# Patient Record
Sex: FEMALE | Race: WHITE | NOT HISPANIC OR LATINO | URBAN - METROPOLITAN AREA
[De-identification: names, ages, dates, MRNs, and addresses within clinical notes are randomized per-mention and may not be internally consistent; named-entity substitution may affect disease eponyms.]

---

## 2023-11-07 PROBLEM — M54.50 CHRONIC MIDLINE LOW BACK PAIN WITHOUT SCIATICA: Status: ACTIVE | Noted: 2023-11-07

## 2023-11-07 PROBLEM — M99.09 SEGMENTAL AND SOMATIC DYSFUNCTION: Status: ACTIVE | Noted: 2023-11-07

## 2023-11-07 PROBLEM — M25.551 HIP PAIN, RIGHT: Status: ACTIVE | Noted: 2023-11-07

## 2023-11-07 PROBLEM — M54.2 CERVICALGIA: Status: ACTIVE | Noted: 2023-11-07

## 2023-11-07 PROBLEM — M54.50 LUMBAGO: Status: ACTIVE | Noted: 2023-11-07

## 2023-11-07 PROBLEM — M53.3 SACRAL BACK PAIN: Status: ACTIVE | Noted: 2023-11-07

## 2023-11-07 PROBLEM — M79.10 MYALGIA: Status: ACTIVE | Noted: 2023-11-07

## 2023-11-07 PROBLEM — G89.29 CHRONIC MIDLINE LOW BACK PAIN WITHOUT SCIATICA: Status: ACTIVE | Noted: 2023-11-07

## 2023-11-07 PROBLEM — M54.6 THORACIC SPINE PAIN: Status: ACTIVE | Noted: 2023-11-07

## 2023-11-07 PROBLEM — G89.29 CHRONIC BILATERAL BACK PAIN: Status: ACTIVE | Noted: 2023-11-07

## 2023-11-07 PROBLEM — M54.9 CHRONIC BILATERAL BACK PAIN: Status: ACTIVE | Noted: 2023-11-07

## 2023-11-07 ASSESSMENT — ENCOUNTER SYMPTOMS
AGITATION: 0
FACIAL ASYMMETRY: 0
ABDOMINAL PAIN: 0
NAUSEA: 0
FEVER: 0
CHILLS: 0
SHORTNESS OF BREATH: 0
CONFUSION: 0
DIFFICULTY URINATING: 0

## 2023-11-07 NOTE — PROGRESS NOTES
Subjective   Patient ID: Virginia Rosas is a 33 y.o. female who presents today for the re-examination and treatment of pain involving their R>L neck/upper back and right sided low back tightness.    This is visit 5/15 of the calendar year. Medicaid plan.    Fall risk: None. No falls in the last 6 months.     HPI - Virginia presents today with similar complaints of right more than right neck/trapezius and right sided low back pain. Her last visit was in May of this year as she is getting closer to finishing school in May of next year. She will go on walks and perform lighter intensity workouts to maintain her physical health. Uses a massage ball for trigger joints for pain relief. No additional injuries or changes in her medical history.    Patient describes the pain as achiness, tightness, and nagging, and rates the current pain 4 out of 10 (10 being the worst).     Last treatment visit 5/12/23: She is done with the current semester and has a whole week off from school. Today she would like to check full spine for any restrictions or imbalances. The most localized area of tightness and tension is in the upper back neck and shoulders.     Otherwise no significant changes to her past medical history.    Review of Systems   Constitutional:  Negative for chills and fever.   HENT:  Negative for congestion and sneezing.    Eyes:  Negative for visual disturbance.   Respiratory:  Negative for shortness of breath.    Cardiovascular:  Negative for chest pain.   Gastrointestinal:  Negative for abdominal pain and nausea.   Endocrine: Negative for polyuria.   Genitourinary:  Negative for difficulty urinating.   Neurological:  Negative for facial asymmetry.   Psychiatric/Behavioral:  Negative for agitation and confusion.      Objective   Observation : normal gait and normal posture    Physical Exam  Musculoskeletal:      Cervical back: Tenderness (right upper trapezius) present. No swelling or deformity. No pain with movement.  Normal range of motion.      Thoracic back: Normal. No swelling or deformity. Normal range of motion.      Lumbar back: Tenderness present. No swelling or deformity.   Neurological:      General: No focal deficit present.      Mental Status: She is alert and oriented to person, place, and time.      Sensory: Sensation is intact.      Motor: Motor function is intact.      Coordination: Coordination is intact.      Gait: Gait is intact.     Examination findings (palpation & ROM): Tenderness and hypertonicity over the upper trapezius more on the right than left.  Additional trigger points and hypertonicity of the C/T paraspinals, levator scap, rhomboids.       Segmental joint dysfunction was identified in the following areas using motion palpation and/or pain provocation assessment:  Cervical: C0-C5 (Supine)  Thoracic: T3-T8 (Prone)  Lumbopelvic: R SIJ (Drop), L3-L5 (Side-posture).     Today's treatment:  Performed spinal manipulation to the regions of segmental dysfunction identified on examination using age-appropriate and injury specific force, and manual diversified technique.     Brief seated soft tissue manipulation was performed including IASTM (instrument assisted soft tissue mobilization), MFR (Myofacial Release) and IC (ischemic compression) to the hypertonic paraspinal muscles including R>L upper trapezius, R>L cervical/thoracic paraspinals, R>L levator scapulae, rhomboids to patient tolerance. Dry needling was also performed with IDN guidelines to the same affected soft tissues. Specifically the R>L UT muscles/tissues (10 in, 10 out). Supine MFR and IC to the suboccipitals, cervical paraspinals, R>L levator scapulae, and R>L UT. Passive R and L hip flexor stretch prone.     Recommended downward dog on the wall, theracane, and massage ball to increase mobility and relief neck and low back tightness.     Treatment Plan:   The patient and I discussed the risks and benefits of chiropractic care. Based on the  patient's subjective complaints along with the examination findings, it is advised that a course of chiropractic treatment by initiated. Consent for care was given both written and orally by the patient. The patient tolerated today's treatment with little or no additional discomfort and was instructed to contact the office for questions or concerns.     Treatment Frequency: Will see/treat patient every 2-4 weeks as therapeutic benefit is sustained, then frequency will be reduced to as needed for symptom management or as needed for acute flare-ups/exacerbation.     Please note: Voice-to-text software was used when completing this note.  While the note was proofread, portions may include grammatical errors.  Please contact me with any questions/concerns as it relates to these types of errors.

## 2023-11-08 ENCOUNTER — ALLIED HEALTH (OUTPATIENT)
Dept: INTEGRATIVE MEDICINE | Facility: CLINIC | Age: 33
End: 2023-11-08
Payer: MEDICAID

## 2023-11-08 DIAGNOSIS — M99.03 SEGMENTAL AND SOMATIC DYSFUNCTION OF LUMBAR REGION: ICD-10-CM

## 2023-11-08 DIAGNOSIS — M99.04 SEGMENTAL AND SOMATIC DYSFUNCTION OF SACRAL REGION: ICD-10-CM

## 2023-11-08 DIAGNOSIS — M79.9 POSTURAL STRAIN: ICD-10-CM

## 2023-11-08 DIAGNOSIS — M99.02 SEGMENTAL AND SOMATIC DYSFUNCTION OF THORACIC REGION: ICD-10-CM

## 2023-11-08 DIAGNOSIS — M99.05 SEGMENTAL AND SOMATIC DYSFUNCTION OF PELVIC REGION: ICD-10-CM

## 2023-11-08 DIAGNOSIS — M54.50 RIGHT-SIDED LOW BACK PAIN WITHOUT SCIATICA, UNSPECIFIED CHRONICITY: ICD-10-CM

## 2023-11-08 DIAGNOSIS — M54.2 NECK PAIN: ICD-10-CM

## 2023-11-08 DIAGNOSIS — M99.01 SEGMENTAL AND SOMATIC DYSFUNCTION OF CERVICAL REGION: Primary | ICD-10-CM

## 2023-11-08 DIAGNOSIS — M53.3 SACRAL BACK PAIN: ICD-10-CM

## 2023-11-08 DIAGNOSIS — M79.10 MYALGIA: ICD-10-CM

## 2023-11-08 PROCEDURE — 98942 CHIROPRACTIC MANJ 5 REGIONS: CPT | Performed by: CHIROPRACTOR

## 2023-11-08 PROCEDURE — 99213 OFFICE O/P EST LOW 20 MIN: CPT | Performed by: CHIROPRACTOR

## 2023-11-14 NOTE — PROGRESS NOTES
Assessment/Plan   The patient's R sided neck/upper back pain/tightness is consistent with myofascial pain and possible compensation related to R shoulder limited IR (possible originally lost ROM due to overhead activity / tennis).  There are no red flags or deficits on examination aside from a trace positive left Vazquez's reflex which may be normal for her.  She has responded well in the past to chiropractic spinal manipulation and advised continuing this therapy in addition to soft tissue therapies and possibly dry needling including areas of chief complaint such as the upper trapezius    Visits this year: 6    Subjective   Patient reports she got relief from her last visit but has had gradual return of frequent mild pain and tightness affecting the right cervical thoracic region lower back locally and occasionally gets tingling in her right calf.  Notes that she exercises regularly but has dealt with chronic pain and tightness in these areas.  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  She stretches regularly in addition to exercise and does some self massage with a ball in this area to try to loosen it up.  Has had a lot of benefit with chiropractic treatments in the past although the upper trapezius muscle does not seem to fully loosen. Feels she is overall healthy yet does endorse allergies to tree nuts and reports sensitivity to gluten/dairy.    LBP 11/15/23 - chronic mild LBP R>L and tightness in R hip. Occasional  tingling R calf.    Review of Systems   Constitutional:  Negative for fever.   Eyes:  Negative for visual disturbance.   Respiratory:  Negative for shortness of breath.    Cardiovascular:  Negative for chest pain.   Gastrointestinal:  Negative for diarrhea, nausea and vomiting.   Genitourinary:  Negative for difficulty urinating and dysuria.   Skin:  Negative for color change.   Neurological:  Negative for dizziness.   Psychiatric/Behavioral:  Negative for agitation.    All other systems reviewed and are negative.    Objective   Examination findings (e.g., palpation & ROM): Decreased C/S and L/S ROM with pain, hypertonic and tender cervical and lumbar erectors, mild restriction R hip ROM and R shoulder IR 20* with pain/tightness    Segmental joint dysfunction was identified in the following areas using motion palpation and/or pain provocation assessment:  Cervical: 1-2 (L)  Thoracic: 1-2, 6-8, 12  Lumbopelvic:  1-2, BL SIJ    Physical Exam  Neurological:      General: No focal deficit present.      Mental Status: She is alert.      Sensory: Sensation is intact.      Motor: Motor function is intact.      Coordination: Coordination is intact.      Gait: Gait is intact.      Deep Tendon Reflexes: Reflexes are normal and symmetric.      Reflex Scores:       Tricep reflexes are 2+ on the right side and 2+ on the left side.       Bicep reflexes are 2+ on the right side and 2+ on the left side.       Brachioradialis reflexes are 2+ on the right side and 2+ on the left side.       Patellar reflexes are 2+ on the right side and 2+ on the left side.       Achilles reflexes are 2+ on the right side and 2+ on the left side.     Comments: Faint +ve Vazquez's reflex (L) 11/15/23       Plan   Today's treatment:  STM to patient tolerance to hypertonic paraspinal muscles  SMT to regions of segmental dysfunction identified on exam, using age-appropriate force, and manual diversified technique.   Dry needling (10 in, 10 out) to region  of the chief complaint / hypertonic muscles identified upon palpation in R upper trapezius  Manual dynamic stretching of the gluteal muscles, hamstrings, upper trapezius    Treatment Plan:   The patient and I discussed the risks and benefits of chiropractic care. Based on the patient's subjective complaints along with the examination findings, it is advised that a course of chiropractic treatment by initiated. The patient provided consent for care. The patient tolerated today's treatment with little or no additional discomfort and was instructed to contact the office for questions or concerns. Will see patient once per week then every 2 weeks when symptoms become mild/manageable, further spaced apart contingent upon improvement.     This chart note was generated using dictation software, and as such, there may be typographical errors present. Abbreviations: Cervical spine (CS), cervical-thoracic (CT), Dry needling (DN), Flexion adduction internal rotation (FAIR), high velocity, low amplitude (HVLA), Lumbar spine (LS), Soft tissue manipulation (STM), spinal manipulative therapy (SMT), Straight leg raise (SLR), Thoracic spine (TS).

## 2023-11-15 ENCOUNTER — ALLIED HEALTH (OUTPATIENT)
Dept: INTEGRATIVE MEDICINE | Facility: CLINIC | Age: 33
End: 2023-11-15
Payer: MEDICAID

## 2023-11-15 DIAGNOSIS — M99.02 SEGMENTAL AND SOMATIC DYSFUNCTION OF THORACIC REGION: ICD-10-CM

## 2023-11-15 DIAGNOSIS — M99.01 CERVICAL SEGMENT DYSFUNCTION: ICD-10-CM

## 2023-11-15 DIAGNOSIS — M79.10 MYALGIA: ICD-10-CM

## 2023-11-15 DIAGNOSIS — M54.2 NECK PAIN: ICD-10-CM

## 2023-11-15 DIAGNOSIS — M99.03 SEGMENTAL AND SOMATIC DYSFUNCTION OF LUMBAR REGION: Primary | ICD-10-CM

## 2023-11-15 PROCEDURE — 99213 OFFICE O/P EST LOW 20 MIN: CPT | Performed by: CHIROPRACTOR

## 2023-11-15 PROCEDURE — 98941 CHIROPRACT MANJ 3-4 REGIONS: CPT | Performed by: CHIROPRACTOR

## 2023-11-15 ASSESSMENT — ENCOUNTER SYMPTOMS
DIARRHEA: 0
FEVER: 0
VOMITING: 0
AGITATION: 0
DYSURIA: 0
DIFFICULTY URINATING: 0
SHORTNESS OF BREATH: 0
COLOR CHANGE: 0
NAUSEA: 0
DIZZINESS: 0

## 2023-11-22 ENCOUNTER — ALLIED HEALTH (OUTPATIENT)
Dept: INTEGRATIVE MEDICINE | Facility: CLINIC | Age: 33
End: 2023-11-22
Payer: MEDICAID

## 2023-11-22 DIAGNOSIS — M54.2 NECK PAIN: ICD-10-CM

## 2023-11-22 DIAGNOSIS — M99.01 SEGMENTAL AND SOMATIC DYSFUNCTION OF CERVICAL REGION: Primary | ICD-10-CM

## 2023-11-22 DIAGNOSIS — M99.05 SEGMENTAL AND SOMATIC DYSFUNCTION OF PELVIC REGION: ICD-10-CM

## 2023-11-22 DIAGNOSIS — M99.02 SEGMENTAL AND SOMATIC DYSFUNCTION OF THORACIC REGION: ICD-10-CM

## 2023-11-22 DIAGNOSIS — M54.50 RIGHT-SIDED LOW BACK PAIN WITHOUT SCIATICA, UNSPECIFIED CHRONICITY: ICD-10-CM

## 2023-11-22 DIAGNOSIS — M99.03 SEGMENTAL AND SOMATIC DYSFUNCTION OF LUMBAR REGION: ICD-10-CM

## 2023-11-22 PROCEDURE — 98941 CHIROPRACT MANJ 3-4 REGIONS: CPT | Performed by: CHIROPRACTOR

## 2023-11-22 ASSESSMENT — ENCOUNTER SYMPTOMS
JOINT SWELLING: 0
FEVER: 0
TROUBLE SWALLOWING: 0
CONSTIPATION: 0
CONFUSION: 0
FREQUENCY: 0
ABDOMINAL PAIN: 0
DIARRHEA: 0
FATIGUE: 0
CHEST TIGHTNESS: 0

## 2023-11-22 NOTE — PROGRESS NOTES
Subjective   Patient ID: Virginia Rosas is a 33 y.o. female who presents today for full spine complaints.    8/15 [facility]    HPI -patient is presenting for continued chiropractic care.  She typically sees one of my coworkers for treatment, but wished to be seen by first available.  She is endorsing tension and soreness throughout the spine, but most notably in the upper trapezius muscles.  Discomfort is greater on the right.  She denies any ominous symptoms.  She reports that her history of playing tennis as well as playing violin are contributing factors to this.  She traditionally responds well to chiropractic care.    Review of Systems   Constitutional:  Negative for fatigue and fever.   HENT:  Negative for trouble swallowing.    Eyes:  Negative for visual disturbance.   Respiratory:  Negative for chest tightness.    Cardiovascular:  Negative for chest pain and leg swelling.   Gastrointestinal:  Negative for abdominal pain, constipation and diarrhea.   Genitourinary:  Negative for frequency.   Musculoskeletal:  Negative for joint swelling.   Neurological:  Negative for syncope.   Psychiatric/Behavioral:  Negative for confusion.    All other systems reviewed and are negative.      Relevant Imaging:    Objective     The patient is alert and oriented x 3. Loss of cervical and thoracic curves. Cranial nerves II-XII are grossly intact. No difficulty with transitional movements is observed.  No deficits observed when analyzing gait.  No scarring is present.  No evidence of muscle wasting.    Moderate hypertonicity and tenderness is present in the cervical paraspinals, upper trapezius, levator scapulae, rhomboids, thoracic paraspinals, lumbar paraspinals, greater on the right.    Segmental joint dysfunction was assessed with motion palpation and is identified in the following areas:  Cervical: C4/5  Thoracic: T3/4, T4/5, T5/6  Lumbopelvic: L4/5, L5/S1, Right SI,    Assessment/Plan   Segmental and somatic dysfunction,  myofascial pain syndrome, postural strain.      Today's treatment:  Diversified manipulation applied to the involved cervical, thoracic and lumbar segments, and right SI joint.      Neuromuscular re-education: Integrative dry needling (3 in/out) applied to the upper trapezius muscles bilaterally. Manual therapy techniques in the form of IASTM applied to the upper trapezius muscles.  7 minutes.    Treatment Plan:   The patient tolerated today's treatment with little or no additional discomfort and was instructed to contact the office for questions or concerns. They were advised on post-treatment soreness. Return as needed.    Please note: Voice to text software was used when completing this note. While the note was proofread, portions may include grammatical errors. Please contact me with any questions/concerns as it relates to these types of errors.

## 2023-11-29 ENCOUNTER — ALLIED HEALTH (OUTPATIENT)
Dept: INTEGRATIVE MEDICINE | Facility: CLINIC | Age: 33
End: 2023-11-29
Payer: MEDICAID

## 2023-11-29 DIAGNOSIS — M79.9 POSTURAL STRAIN: ICD-10-CM

## 2023-11-29 DIAGNOSIS — M54.2 NECK PAIN: ICD-10-CM

## 2023-11-29 DIAGNOSIS — M99.05 SEGMENTAL AND SOMATIC DYSFUNCTION OF PELVIC REGION: ICD-10-CM

## 2023-11-29 DIAGNOSIS — M99.04 SEGMENTAL AND SOMATIC DYSFUNCTION OF SACRAL REGION: ICD-10-CM

## 2023-11-29 DIAGNOSIS — M99.02 SEGMENTAL AND SOMATIC DYSFUNCTION OF THORACIC REGION: ICD-10-CM

## 2023-11-29 DIAGNOSIS — M79.10 MYALGIA: ICD-10-CM

## 2023-11-29 DIAGNOSIS — M99.01 SEGMENTAL AND SOMATIC DYSFUNCTION OF CERVICAL REGION: Primary | ICD-10-CM

## 2023-11-29 DIAGNOSIS — M53.3 SACRAL BACK PAIN: ICD-10-CM

## 2023-11-29 DIAGNOSIS — M54.50 RIGHT-SIDED LOW BACK PAIN WITHOUT SCIATICA, UNSPECIFIED CHRONICITY: ICD-10-CM

## 2023-11-29 DIAGNOSIS — M99.03 SEGMENTAL AND SOMATIC DYSFUNCTION OF LUMBAR REGION: ICD-10-CM

## 2023-11-29 PROCEDURE — 98942 CHIROPRACTIC MANJ 5 REGIONS: CPT | Performed by: CHIROPRACTOR

## 2023-11-29 ASSESSMENT — ENCOUNTER SYMPTOMS
CHILLS: 0
ABDOMINAL PAIN: 0
FACIAL ASYMMETRY: 0
CONFUSION: 0
AGITATION: 0
NAUSEA: 0
DIFFICULTY URINATING: 0
SHORTNESS OF BREATH: 0
FEVER: 0

## 2023-11-29 NOTE — PROGRESS NOTES
Subjective   Patient ID: Virginia Rosas is a 33 y.o. female who presents today for the re-examination and treatment of pain involving their R>L neck/upper back and right sided low back tightness.    This is visit 8/15 of the calendar year. Medicaid plan.    Fall risk: None. No falls in the last 6 months.     HPI - Virginia would like to focus treatment on her right side of the lower back today.  This is a newer symptom but she reports she has had this pain in the past.  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  She feels that her last couple adjustments they have not gotten a lot of movement in the lower back so hopefully if we do a little soft tissue work before we can get better release of the joints today.  Secondary complaints are her chronically tight neck upper back and shoulders.    Patient describes the pain as achiness, tightness, and nagging, and rates the current pain 4 out of 10 (10 being the worst).     Last treatment visit 11/22/23 with Dr. Fregoso.      Otherwise no significant changes to her past medical history.    Review of Systems   Constitutional:  Negative for chills and fever.   HENT:  Negative for congestion and sneezing.    Eyes:  Negative for visual disturbance.   Respiratory:  Negative for shortness of breath.    Cardiovascular:  Negative for chest pain.   Gastrointestinal:  Negative for abdominal pain and nausea.   Endocrine: Negative for polyuria.   Genitourinary:  Negative for difficulty urinating.   Neurological:  Negative for facial asymmetry.   Psychiatric/Behavioral:  Negative for agitation and confusion.      Objective   Observation : normal gait and normal posture    Physical ExamExamination findings (palpation & ROM): Tenderness, hypertonicity and trigger points throughout the right quadratus lumborum muscle which does reproduce her symptoms.  Additional, but to a lesser degree, hypertonicity and tenderness through the upper trapezius, rhomboids  and lower traps.      Segmental joint dysfunction was identified in the following areas using motion palpation and/or pain provocation assessment:  Cervical: C0-C5 (Supine)  Thoracic: T3-T8 (Prone)  Lumbopelvic: R SIJ (Drop), L3-L5 (Side-posture).     Today's treatment:  Performed spinal manipulation to the regions of segmental dysfunction identified on examination using age-appropriate and injury specific force, and manual diversified technique.     Brief side-lying soft tissue manipulation was performed including IASTM (instrument assisted soft tissue mobilization), MFR (Myofacial Release) and IC (ischemic compression) to the right quadratus lumborum, right lumbar paraspinals.  Dry needling was also performed to the right QL 16, 6 out.  Supine MFR and IC to the suboccipitals, cervical paraspinals, R>L levator scapulae, and R>L UT. Passive R and L hip flexor stretch prone.     Recommended downward dog on the wall, theracane, and massage ball to increase mobility and relief neck and low back tightness.     Treatment Plan:   The patient and I discussed the risks and benefits of chiropractic care. Based on the patient's subjective complaints along with the examination findings, it is advised that a course of chiropractic treatment by initiated. Consent for care was given both written and orally by the patient. The patient tolerated today's treatment with little or no additional discomfort and was instructed to contact the office for questions or concerns.     Treatment Frequency: Will see/treat patient every 2-4 weeks as therapeutic benefit is sustained, then frequency will be reduced to as needed for symptom management or as needed for acute flare-ups/exacerbation.     Please note: Voice-to-text software was used when completing this note.  While the note was proofread, portions may include grammatical errors.  Please contact me with any questions/concerns as it relates to these types of errors.

## 2023-12-06 ENCOUNTER — ALLIED HEALTH (OUTPATIENT)
Dept: INTEGRATIVE MEDICINE | Facility: CLINIC | Age: 33
End: 2023-12-06
Payer: MEDICAID

## 2023-12-06 DIAGNOSIS — M54.2 NECK PAIN: ICD-10-CM

## 2023-12-06 DIAGNOSIS — M79.10 MYALGIA: ICD-10-CM

## 2023-12-06 DIAGNOSIS — M99.02 SEGMENTAL AND SOMATIC DYSFUNCTION OF THORACIC REGION: ICD-10-CM

## 2023-12-06 DIAGNOSIS — M99.03 SEGMENTAL AND SOMATIC DYSFUNCTION OF LUMBAR REGION: ICD-10-CM

## 2023-12-06 DIAGNOSIS — M53.3 SACRAL BACK PAIN: ICD-10-CM

## 2023-12-06 DIAGNOSIS — M54.50 RIGHT-SIDED LOW BACK PAIN WITHOUT SCIATICA, UNSPECIFIED CHRONICITY: ICD-10-CM

## 2023-12-06 DIAGNOSIS — M79.9 POSTURAL STRAIN: ICD-10-CM

## 2023-12-06 DIAGNOSIS — M99.04 SEGMENTAL AND SOMATIC DYSFUNCTION OF SACRAL REGION: ICD-10-CM

## 2023-12-06 DIAGNOSIS — M99.01 SEGMENTAL AND SOMATIC DYSFUNCTION OF CERVICAL REGION: Primary | ICD-10-CM

## 2023-12-06 DIAGNOSIS — M99.05 SEGMENTAL AND SOMATIC DYSFUNCTION OF PELVIC REGION: ICD-10-CM

## 2023-12-06 PROCEDURE — 98942 CHIROPRACTIC MANJ 5 REGIONS: CPT | Performed by: CHIROPRACTOR

## 2023-12-06 ASSESSMENT — ENCOUNTER SYMPTOMS
CONFUSION: 0
FEVER: 0
ABDOMINAL PAIN: 0
SHORTNESS OF BREATH: 0
FACIAL ASYMMETRY: 0
AGITATION: 0
NAUSEA: 0
DIFFICULTY URINATING: 0
CHILLS: 0

## 2023-12-06 NOTE — PROGRESS NOTES
Subjective   Patient ID: Virginia Rosas is a 33 y.o. female who presents today for the re-examination and treatment of pain involving their R>L neck/upper back and right sided low back tightness.    This is visit 9/15 of the calendar year. Medicaid plan.    Fall risk: None. No falls in the last 6 months.     HPI - Last visit we worked on her R QL.  This did make a significant difference and she would like to continue to work on this area again today.  She also continues to have chronic tightness of her neck upper back and shoulders.    Patient describes the pain as achiness, tightness, and nagging, and rates the current pain 4 out of 10 (10 being the worst).     Last treatment visit 11/29/23: Virginia would like to focus treatment on her right side of the lower back today.  This is a newer symptom but she reports she has had this pain in the past.  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  She feels that her last couple adjustments they have not gotten a lot of movement in the lower back so hopefully if we do a little soft tissue work before we can get better release of the joints today.  Secondary complaints are her chronically tight neck upper back and shoulders.     Otherwise no significant changes to her past medical history.    Review of Systems   Constitutional:  Negative for chills and fever.   HENT:  Negative for congestion and sneezing.    Eyes:  Negative for visual disturbance.   Respiratory:  Negative for shortness of breath.    Cardiovascular:  Negative for chest pain.   Gastrointestinal:  Negative for abdominal pain and nausea.   Endocrine: Negative for polyuria.   Genitourinary:  Negative for difficulty urinating.   Neurological:  Negative for facial asymmetry.   Psychiatric/Behavioral:  Negative for agitation and confusion.      Objective   Observation : normal gait and normal posture    Physical ExamExamination findings (palpation & ROM): Tenderness, hypertonicity  and trigger points throughout the right quadratus lumborum muscle which does reproduce her symptoms.  Additional, but to a lesser degree, hypertonicity and tenderness through the upper trapezius, rhomboids and lower traps.      Segmental joint dysfunction was identified in the following areas using motion palpation and/or pain provocation assessment:  Cervical: C0-C5 (Supine)  Thoracic: T3-T8 (Prone)  Lumbopelvic: R SIJ (Drop), L3-L5 (Side-posture).     Today's treatment:  Performed spinal manipulation to the regions of segmental dysfunction identified on examination using age-appropriate and injury specific force, and manual diversified technique.     Brief side-lying soft tissue manipulation was performed including IASTM (instrument assisted soft tissue mobilization), MFR (Myofacial Release) and IC (ischemic compression) to the right quadratus lumborum, right lumbar paraspinals.  Dry needling was also performed to the right QL (10 in, 10 out).   Supine MFR and IC to the suboccipitals, cervical paraspinals, R>L levator scapulae, and R>L UT. Passive R and L hip flexor stretch prone.     Treatment Plan:   The patient and I discussed the risks and benefits of chiropractic care. Based on the patient's subjective complaints along with the examination findings, it is advised that a course of chiropractic treatment by initiated. Consent for care was given both written and orally by the patient. The patient tolerated today's treatment with little or no additional discomfort and was instructed to contact the office for questions or concerns.     Treatment Frequency: Will see/treat patient every 2-4 weeks as therapeutic benefit is sustained, then frequency will be reduced to as needed for symptom management or as needed for acute flare-ups/exacerbation.     Please note: Voice-to-text software was used when completing this note.  While the note was proofread, portions may include grammatical errors.  Please contact me with  any questions/concerns as it relates to these types of errors.

## 2023-12-13 ENCOUNTER — ALLIED HEALTH (OUTPATIENT)
Dept: INTEGRATIVE MEDICINE | Facility: CLINIC | Age: 33
End: 2023-12-13
Payer: MEDICAID

## 2023-12-13 DIAGNOSIS — M99.02 SEGMENTAL AND SOMATIC DYSFUNCTION OF THORACIC REGION: ICD-10-CM

## 2023-12-13 DIAGNOSIS — M54.50 RIGHT-SIDED LOW BACK PAIN WITHOUT SCIATICA, UNSPECIFIED CHRONICITY: ICD-10-CM

## 2023-12-13 DIAGNOSIS — M53.3 SACRAL BACK PAIN: ICD-10-CM

## 2023-12-13 DIAGNOSIS — M99.04 SEGMENTAL AND SOMATIC DYSFUNCTION OF SACRAL REGION: ICD-10-CM

## 2023-12-13 DIAGNOSIS — M99.05 SEGMENTAL AND SOMATIC DYSFUNCTION OF PELVIC REGION: ICD-10-CM

## 2023-12-13 DIAGNOSIS — M99.01 SEGMENTAL AND SOMATIC DYSFUNCTION OF CERVICAL REGION: Primary | ICD-10-CM

## 2023-12-13 DIAGNOSIS — M79.10 MYALGIA: ICD-10-CM

## 2023-12-13 DIAGNOSIS — M79.9 POSTURAL STRAIN: ICD-10-CM

## 2023-12-13 DIAGNOSIS — M99.03 SEGMENTAL AND SOMATIC DYSFUNCTION OF LUMBAR REGION: ICD-10-CM

## 2023-12-13 DIAGNOSIS — M54.2 NECK PAIN: ICD-10-CM

## 2023-12-13 PROCEDURE — 98942 CHIROPRACTIC MANJ 5 REGIONS: CPT | Performed by: CHIROPRACTOR

## 2023-12-13 ASSESSMENT — ENCOUNTER SYMPTOMS
AGITATION: 0
FEVER: 0
CONFUSION: 0
SHORTNESS OF BREATH: 0
ABDOMINAL PAIN: 0
CHILLS: 0
DIFFICULTY URINATING: 0
FACIAL ASYMMETRY: 0
NAUSEA: 0

## 2023-12-13 NOTE — PROGRESS NOTES
Subjective   Patient ID: Virginia Rosas is a 33 y.o. female who presents today for the re-examination and treatment of pain involving their R>L neck/upper back and right sided low back tightness.    This is visit 10/15 of the calendar year. Medicaid plan.    Fall risk: None. No falls in the last 6 months.     HPI -lower back pain is feeling significantly better after her last treatment.  She has been able to work out more regularly because she has been off from school for the last few days.  She suspects this may be why her neck and upper back are little more sore and stiff than usual.  She would like to focus treatment on the right greater than left upper trap and neck but check full spine for any additional joint restrictions or muscular imbalances.    Patient describes the pain as achiness, tightness, and nagging, and rates the current pain 4 out of 10 (10 being the worst).     Last treatment visit 12/6/23: Last visit we worked on her R QL.  This did make a significant difference and she would like to continue to work on this area again today.  She also continues to have chronic tightness of her neck upper back and shoulders.    11/29/23: Virginia would like to focus treatment on her right side of the lower back today.  This is a newer symptom but she reports she has had this pain in the past.  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  She feels that her last couple adjustments they have not gotten a lot of movement in the lower back so hopefully if we do a little soft tissue work before we can get better release of the joints today.  Secondary complaints are her chronically tight neck upper back and shoulders.     Otherwise no significant changes to her past medical history.    Review of Systems   Constitutional:  Negative for chills and fever.   HENT:  Negative for congestion and sneezing.    Eyes:  Negative for visual disturbance.   Respiratory:  Negative for shortness of  breath.    Cardiovascular:  Negative for chest pain.   Gastrointestinal:  Negative for abdominal pain and nausea.   Endocrine: Negative for polyuria.   Genitourinary:  Negative for difficulty urinating.   Neurological:  Negative for facial asymmetry.   Psychiatric/Behavioral:  Negative for agitation and confusion.      Objective   Observation : normal gait and normal posture    Physical ExamExamination findings (palpation & ROM): Tenderness, hypertonicity and trigger points throughout the right quadratus lumborum muscle which does reproduce her symptoms.  Additional, but to a lesser degree, hypertonicity and tenderness through the upper trapezius, rhomboids and lower traps.      Segmental joint dysfunction was identified in the following areas using motion palpation and/or pain provocation assessment:  Cervical: C0-C5 (Supine)  Thoracic: T3-T8 (Prone)  Lumbopelvic: R SIJ (Drop), L3-L5 (Side-posture).     Today's treatment:  Performed spinal manipulation to the regions of segmental dysfunction identified on examination using age-appropriate and injury specific force, and manual diversified technique.     Brief supine myofascial release to the neck upper back and shoulders including the right upper trapezius and levator scapula.  Additional dry needling in prone position to the bilateral upper trapezii (10 in, 10 out) and additional ischemic compression to the lumbar paraspinals prior to manipulation.    Treatment Plan:   The patient and I discussed the risks and benefits of chiropractic care. Based on the patient's subjective complaints along with the examination findings, it is advised that a course of chiropractic treatment by initiated. Consent for care was given both written and orally by the patient. The patient tolerated today's treatment with little or no additional discomfort and was instructed to contact the office for questions or concerns.     Treatment Frequency: Will see/treat patient every 2-4 weeks as  therapeutic benefit is sustained, then frequency will be reduced to as needed for symptom management or as needed for acute flare-ups/exacerbation.     Please note: Voice-to-text software was used when completing this note.  While the note was proofread, portions may include grammatical errors.  Please contact me with any questions/concerns as it relates to these types of errors.

## 2023-12-19 ENCOUNTER — ALLIED HEALTH (OUTPATIENT)
Dept: INTEGRATIVE MEDICINE | Facility: CLINIC | Age: 33
End: 2023-12-19
Payer: MEDICAID

## 2023-12-19 DIAGNOSIS — G89.29 CHRONIC BILATERAL THORACIC BACK PAIN: ICD-10-CM

## 2023-12-19 DIAGNOSIS — G89.29 CHRONIC BILATERAL LOW BACK PAIN WITHOUT SCIATICA: ICD-10-CM

## 2023-12-19 DIAGNOSIS — M99.05 SEGMENTAL AND SOMATIC DYSFUNCTION OF PELVIC REGION: ICD-10-CM

## 2023-12-19 DIAGNOSIS — M79.9 POSTURAL STRAIN: ICD-10-CM

## 2023-12-19 DIAGNOSIS — M99.02 SEGMENTAL AND SOMATIC DYSFUNCTION OF THORACIC REGION: ICD-10-CM

## 2023-12-19 DIAGNOSIS — M54.50 CHRONIC BILATERAL LOW BACK PAIN WITHOUT SCIATICA: ICD-10-CM

## 2023-12-19 DIAGNOSIS — M79.10 MYALGIA: ICD-10-CM

## 2023-12-19 DIAGNOSIS — M99.03 SEGMENTAL AND SOMATIC DYSFUNCTION OF LUMBAR REGION: ICD-10-CM

## 2023-12-19 DIAGNOSIS — M99.04 SEGMENTAL AND SOMATIC DYSFUNCTION OF SACRAL REGION: ICD-10-CM

## 2023-12-19 DIAGNOSIS — M99.01 SEGMENTAL AND SOMATIC DYSFUNCTION OF CERVICAL REGION: Primary | ICD-10-CM

## 2023-12-19 DIAGNOSIS — M54.6 CHRONIC BILATERAL THORACIC BACK PAIN: ICD-10-CM

## 2023-12-19 DIAGNOSIS — M54.2 NECK PAIN: ICD-10-CM

## 2023-12-19 PROCEDURE — 98942 CHIROPRACTIC MANJ 5 REGIONS: CPT | Performed by: CHIROPRACTOR

## 2023-12-19 ASSESSMENT — ENCOUNTER SYMPTOMS
CONFUSION: 0
FEVER: 0
NAUSEA: 0
SHORTNESS OF BREATH: 0
FACIAL ASYMMETRY: 0
AGITATION: 0
DIFFICULTY URINATING: 0
ABDOMINAL PAIN: 0
CHILLS: 0

## 2023-12-19 NOTE — PROGRESS NOTES
Subjective   Patient ID: Virginia Rosas is a 33 y.o. female who presents today for the re-examination and treatment of pain involving their R>L neck/upper back and right sided low back tightness.    This is visit 11/15 of the calendar year. Medicaid plan.    Fall risk: None. No falls in the last 6 months.     HPI - Again today she would like to focus on the neck and upper back regions. LB is still stiff but she is more aware of the neck today. She is off from class until Nathan 15 and has been working out more at the gym.     Patient describes the pain as achiness, tightness, and nagging, and rates the current pain 4 out of 10 (10 being the worst).     Last treatment visit 12/13/23: lower back pain is feeling significantly better after her last treatment.  She has been able to work out more regularly because she has been off from school for the last few days.  She suspects this may be why her neck and upper back are little more sore and stiff than usual.  She would like to focus treatment on the right greater than left upper trap and neck but check full spine for any additional joint restrictions or muscular imbalances.    12/6/23: Last visit we worked on her R QL.  This did make a significant difference and she would like to continue to work on this area again today.  She also continues to have chronic tightness of her neck upper back and shoulders.    11/29/23: Virginia would like to focus treatment on her right side of the lower back today.  This is a newer symptom but she reports she has had this pain in the past.  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  She feels that her last couple adjustments they have not gotten a lot of movement in the lower back so hopefully if we do a little soft tissue work before we can get better release of the joints today.  Secondary complaints are her chronically tight neck upper back and shoulders.     Otherwise no significant changes to her past  medical history.    Review of Systems   Constitutional:  Negative for chills and fever.   HENT:  Negative for congestion and sneezing.    Eyes:  Negative for visual disturbance.   Respiratory:  Negative for shortness of breath.    Cardiovascular:  Negative for chest pain.   Gastrointestinal:  Negative for abdominal pain and nausea.   Endocrine: Negative for polyuria.   Genitourinary:  Negative for difficulty urinating.   Neurological:  Negative for facial asymmetry.   Psychiatric/Behavioral:  Negative for agitation and confusion.      Objective   Observation : normal gait and normal posture    Physical ExamExamination findings (palpation & ROM): Tenderness, hypertonicity and trigger points throughout the right quadratus lumborum muscle which does reproduce her symptoms.  Additional, but to a lesser degree, hypertonicity and tenderness through the upper trapezius, rhomboids and lower traps.      Segmental joint dysfunction was identified in the following areas using motion palpation and/or pain provocation assessment:  Cervical: C0-C5 (Supine)  Thoracic: T3-T8 (Prone)  Lumbopelvic: R SIJ (Drop), L3-L5 (Side-posture).     Today's treatment:  Performed spinal manipulation to the regions of segmental dysfunction identified on examination using age-appropriate and injury specific force, and manual diversified technique.     Brief supine myofascial release to the neck upper back and shoulders including the right upper trapezius and levator scapula.  Additional dry needling in prone position to the bilateral upper trapezii (10 in, 10 out) and additional ischemic compression to the lumbar paraspinals prior to manipulation.    Treatment Plan:   The patient and I discussed the risks and benefits of chiropractic care. Based on the patient's subjective complaints along with the examination findings, it is advised that a course of chiropractic treatment by initiated. Consent for care was given both written and orally by the  patient. The patient tolerated today's treatment with little or no additional discomfort and was instructed to contact the office for questions or concerns.     Treatment Frequency: Will see/treat patient every 2-4 weeks as therapeutic benefit is sustained, then frequency will be reduced to as needed for symptom management or as needed for acute flare-ups/exacerbation.     Please note: Voice-to-text software was used when completing this note.  While the note was proofread, portions may include grammatical errors.  Please contact me with any questions/concerns as it relates to these types of errors.

## 2023-12-20 ENCOUNTER — APPOINTMENT (OUTPATIENT)
Dept: INTEGRATIVE MEDICINE | Facility: CLINIC | Age: 33
End: 2023-12-20
Payer: MEDICAID

## 2023-12-28 ASSESSMENT — ENCOUNTER SYMPTOMS
DIARRHEA: 0
VOMITING: 0
NAUSEA: 0
ABDOMINAL PAIN: 0
AGITATION: 0
FACIAL ASYMMETRY: 0
DIZZINESS: 0
COLOR CHANGE: 0
DIFFICULTY URINATING: 0
DYSURIA: 0
CHILLS: 0
FEVER: 0
SHORTNESS OF BREATH: 0
CONFUSION: 0

## 2023-12-28 NOTE — PROGRESS NOTES
Assessment/Plan   The patient's R sided neck/upper back pain/tightness is consistent with myofascial pain and possible compensation related to R shoulder limited IR (possible originally lost ROM due to overhead activity / tennis).  There are no red flags or deficits on examination aside from a trace positive left Vazquez's reflex which may be normal for her.  She has responded well in the past to chiropractic spinal manipulation and advised continuing this therapy in addition to soft tissue therapies and possibly dry needling including areas of chief complaint such as the upper trapezius. Takes vitamin D in non-summer months when she's not outside in sun.    Visits this year: 12    Subjective   Patient notes that she does get relief from chiropractic visits including her last session in those with my colleague but endorses that some of her symptoms continue to return including pain and tightness in the right side of the base of her neck and upper trapezius region and right side of the lower back.  Pain is typically frequent and mild with a constant tightness underlying that does not seem to fully go away.  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  She stretches regularly in addition to exercise and does some self massage with a ball in this area to try to loosen it up.  Has had a lot of benefit with chiropractic treatments in the past although the upper trapezius muscle does not  seem to fully loosen. Feels she is overall healthy yet does endorse allergies to tree nuts and reports sensitivity to gluten/dairy.    LBP 11/15/23 - chronic mild LBP R>L and tightness in R hip. Occasional tingling R calf.    Review of Systems   Constitutional:  Negative for chills and fever.   HENT:  Negative for congestion and sneezing.    Eyes:  Negative for visual disturbance.   Respiratory:  Negative for shortness of breath.    Cardiovascular:  Negative for chest pain.   Gastrointestinal:  Negative for abdominal pain, diarrhea, nausea and vomiting.   Endocrine: Negative for polyuria.   Genitourinary:  Negative for difficulty urinating and dysuria.   Skin:  Negative for color change.   Neurological:  Negative for dizziness and facial asymmetry.   Psychiatric/Behavioral:  Negative for agitation and confusion.    All other systems reviewed and are negative.    Objective   Examination findings (e.g., palpation & ROM): Decreased C/S and L/S ROM with pain, hypertonic and tender cervical and lumbar erectors, mild restriction R hip ROM and R shoulder IR 20* with pain/tightness, R upper trapezius    Segmental joint dysfunction was identified in the following areas using motion palpation and/or pain provocation assessment:  Cervical: 1-2 (L)  Thoracic: 1-2, 6-8, 12  Lumbopelvic:  1-2, BL SIJ    Physical Exam  Neurological:      General: No focal deficit present.      Mental Status: She is alert.      Sensory: Sensation is intact.      Motor: Motor function is intact.      Coordination: Coordination is intact.      Gait: Gait is intact.      Deep Tendon Reflexes: Reflexes are normal and symmetric.      Reflex Scores:       Tricep reflexes are 2+ on the right side and 2+ on the left side.       Bicep reflexes are 2+ on the right side and 2+ on the left side.       Brachioradialis reflexes are 2+ on the right side and 2+ on the left side.       Patellar reflexes are 2+ on the right side and 2+ on the left side.        Achilles reflexes are 2+ on the right side and 2+ on the left side.     Comments: Faint +ve Vazquez's reflex (L) 11/15/23       Plan   Today's treatment:  STM to patient tolerance to hypertonic paraspinal muscles  SMT to regions of segmental dysfunction identified on exam, using age-appropriate force, and manual diversified technique.   Dry needling (10 in, 10 out) to region of the chief complaint / hypertonic muscles identified upon palpation in R upper trapezius  Manual dynamic stretching of the gluteal muscles, hamstrings, upper trapezius & levator scapulae BL 5m    Treatment Plan:   The patient and I discussed the risks and benefits of chiropractic care. Based on the patient's subjective complaints along with the examination findings, it is advised that a course of chiropractic treatment by initiated. The patient provided consent for care. The patient tolerated today's treatment with little or no additional discomfort and was instructed to contact the office for questions or concerns. Will see patient once per week then every 2 weeks when symptoms become mild/manageable, further spaced apart contingent upon improvement.     This chart note was generated using dictation software, and as such, there may be typographical errors present. Abbreviations: Cervical spine (CS), cervical-thoracic (CT), Dry needling (DN), Flexion adduction internal rotation (FAIR), high velocity, low amplitude (HVLA), Lumbar spine (LS), Soft tissue manipulation (STM), spinal manipulative therapy (SMT), Straight leg raise (SLR), Thoracic spine (TS).

## 2023-12-29 ENCOUNTER — ALLIED HEALTH (OUTPATIENT)
Dept: INTEGRATIVE MEDICINE | Facility: CLINIC | Age: 33
End: 2023-12-29
Payer: MEDICAID

## 2023-12-29 DIAGNOSIS — M99.01 SEGMENTAL AND SOMATIC DYSFUNCTION OF CERVICAL REGION: ICD-10-CM

## 2023-12-29 DIAGNOSIS — M99.02 SEGMENTAL AND SOMATIC DYSFUNCTION OF THORACIC REGION: ICD-10-CM

## 2023-12-29 DIAGNOSIS — M99.05 SEGMENTAL AND SOMATIC DYSFUNCTION OF PELVIC REGION: ICD-10-CM

## 2023-12-29 DIAGNOSIS — M79.10 MYALGIA: ICD-10-CM

## 2023-12-29 DIAGNOSIS — M99.03 SEGMENTAL AND SOMATIC DYSFUNCTION OF LUMBAR REGION: Primary | ICD-10-CM

## 2023-12-29 DIAGNOSIS — M54.2 NECK PAIN: ICD-10-CM

## 2023-12-29 PROCEDURE — 98941 CHIROPRACT MANJ 3-4 REGIONS: CPT | Performed by: CHIROPRACTOR

## 2024-06-07 ENCOUNTER — ALLIED HEALTH (OUTPATIENT)
Dept: INTEGRATIVE MEDICINE | Facility: CLINIC | Age: 34
End: 2024-06-07
Payer: MEDICAID

## 2024-06-07 VITALS — SYSTOLIC BLOOD PRESSURE: 119 MMHG | DIASTOLIC BLOOD PRESSURE: 70 MMHG | HEART RATE: 84 BPM

## 2024-06-07 DIAGNOSIS — M54.50 RIGHT-SIDED LOW BACK PAIN WITHOUT SCIATICA, UNSPECIFIED CHRONICITY: ICD-10-CM

## 2024-06-07 DIAGNOSIS — M99.03 SEGMENTAL AND SOMATIC DYSFUNCTION OF LUMBAR REGION: Primary | ICD-10-CM

## 2024-06-07 DIAGNOSIS — M99.05 SEGMENTAL AND SOMATIC DYSFUNCTION OF PELVIC REGION: ICD-10-CM

## 2024-06-07 DIAGNOSIS — M54.2 NECK PAIN: ICD-10-CM

## 2024-06-07 DIAGNOSIS — M79.10 MYALGIA: ICD-10-CM

## 2024-06-07 DIAGNOSIS — M99.01 SEGMENTAL AND SOMATIC DYSFUNCTION OF CERVICAL REGION: ICD-10-CM

## 2024-06-07 PROCEDURE — 98941 CHIROPRACT MANJ 3-4 REGIONS: CPT | Performed by: CHIROPRACTOR

## 2024-06-07 ASSESSMENT — PAIN SCALES - GENERAL: PAINLEVEL: 2

## 2024-06-07 NOTE — PROGRESS NOTES
Reason For Visit     Reason for Visit: This is a Follow-Up.~   Neck, and shoulder pain.   Referred by: Self.      History of Present Illness     ADIEL VALDEZ presents today for the examination and treatment of pain involving her neck, mid back, lower back and gluteal.   Right hip.   Provocative factors include running, squatting and twisting.   Palliative factors include movement and stretching.   Pain is described as ache, stabbing and nagging.   Right traps and right hip.   Previous treatment for complaint has included NSAIDS, heat, physical therapy, exercise/stretching and chiropractic.   Patient denies instability, bowel / bladder weakness, clicking, catching, grinding, popping and difficulty walking.      Pre-Treatment Pain Level: 3/10.   Pre-Treatment Anxiety Level: 4/10.   Pre-Treatment Stress Level: 6/10.   Adiel is her for chiropractic care of R neck and shoulder pain, ride sided lower back pain and weakness. She feels more stiffness than pain. She denies recent trauma or changes to her health since our last encounter      Review of Systems        Constitutional: Negative for fever / chills.   Eyes: Negative for blurred or double vision.   Respiratory: Negative for shortness of breath.   Cardiovascular: Negative for chest pain / leg swelling.~Palpitations a while ago, New PCP has her wearing a heart rate monitor. She has been wearing this for 1 week.   Gastrointestinal: Negative for nausea.   Genitourinary: Negative for dysuria.   Neurological: Negative for dizziness / fainting / loss of consciousness / headaches.   Endo/ Heme/ Allergies: Does not bruise/bleed easily.   Psychiatric/ Behavioral: Negative for suicidal ideas / substance abuse.   Musculoskeletal: Negative for numbness / tingling / muscle weakness.~   Negative for joint swelling / fractures / dislocations.   Negative for Complaint: All other systems have been reviewed and are negative for complaint     Physical Exam  Neuro- Vascular appears to  be intact.  The patient is alert and oriented to person, place, and time with normal speech.  Cranial nerves are intact.   Cerebellar function is intact.   Memory appears to be normal and thought process is intact.   No gait abnormalities nor antalgic posture are appreciated.  No muscular tonus noted.   R Hip ROM and right shoulder ROM decreased (+) muscular tension and tenderness in both locations, cervical tension and tenderness (+)   Otherwise no material changes from Dr Fitzpatrick's initial exam dated 6/19/2019            Procedure     Today's treatment included:   Chiropractic Manipulation to the:   Cervical: C6~and~C7.   Thoracic: T4,~T5,~T6,~T10~and~T11.   Lumbar: L3~and~L4.   Sacrum: L5/S1.   sacrum and pelvic.   Upper Extremity: right glenohumeral joint.   Technique Used: diversified CMT,~pelvic blocking technique~and~strain counter strain.          Treatment today included passive ROM and stretch, and soft tissue manipulation (ischemic compression) to cervical, thoracic and lumbar para spinals, bilateral traps, right pectoralis, levator scapulae, sub-occipital, temporalis, diaphragm, iliopsoas, piriformis, upper gluteals, bilateral shoulder, jaw and hip movers     Focused hip adductors and anterior right shoulder, opening up the pelvis to tolerance     Patient Discussion/Summary     The patient tolerated today's treatment with little or no additional discomfort and was instructed to contact the office for questions or concerns.      Follow up when you can! Congrats and the best of luck in your ongoing adventure

## 2024-06-18 ASSESSMENT — ENCOUNTER SYMPTOMS
NAUSEA: 0
COLOR CHANGE: 0
FACIAL ASYMMETRY: 0
AGITATION: 0
DIARRHEA: 0
DIZZINESS: 0
SHORTNESS OF BREATH: 0
DYSURIA: 0
FEVER: 0
CONFUSION: 0
ABDOMINAL PAIN: 0
VOMITING: 0
CHILLS: 0
DIFFICULTY URINATING: 0

## 2024-06-18 NOTE — PROGRESS NOTES
Assessment/Plan   The patient's R sided neck/upper back pain/tightness is consistent with myofascial pain and possible compensation related to R shoulder limited IR (possible originally lost ROM due to overhead activity / tennis).  There are no red flags or deficits on examination aside from a trace positive left Vazquez's reflex which may be normal for her.  She has responded well in the past to chiropractic spinal manipulation and advised continuing this therapy in addition to soft tissue therapies and possibly dry needling including areas of chief complaint such as the upper trapezius. Takes vitamin D in non-summer months when she's not outside in sun.    Visits this year: 2    Subjective   Patient reports that she has been doing relatively well overall but still experiences frequent mild pain and tightness in the right upper trapezius right-sided lower back and right gluteal region.  Has been active with exercise going to the gym doing a mixture of exercise routine including cardio and some strength training and mobility work.  Benefited from chiropractic session recently with Dr. Samuel.    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  She stretches regularly in addition to exercise and does some self massage with a ball in this area to try to loosen it up.  Has had a lot of benefit with chiropractic treatments in the past although the upper trapezius muscle does not seem to fully loosen. Feels she is overall healthy yet does endorse allergies to tree nuts and reports sensitivity to  gluten/dairy.    LBP 11/15/23 - chronic mild LBP R>L and tightness in R hip. Occasional tingling R calf.    Review of Systems   Constitutional:  Negative for chills and fever.   HENT:  Negative for congestion and sneezing.    Eyes:  Negative for visual disturbance.   Respiratory:  Negative for shortness of breath.    Cardiovascular:  Negative for chest pain.   Gastrointestinal:  Negative for abdominal pain, diarrhea, nausea and vomiting.   Endocrine: Negative for polyuria.   Genitourinary:  Negative for difficulty urinating and dysuria.   Skin:  Negative for color change.   Neurological:  Negative for dizziness and facial asymmetry.   Psychiatric/Behavioral:  Negative for agitation and confusion.    All other systems reviewed and are negative.    Objective   Examination findings (e.g., palpation & ROM): Decreased C/S and L/S ROM with pain, hypertonic and tender cervical and lumbar erectors, mild restriction R hip ROM and R shoulder IR 20* with pain/tightness, R upper trapezius  SLR BL 60    Segmental joint dysfunction was identified in the following areas using motion palpation and/or pain provocation assessment:  Cervical: 1-2 (L)  Thoracic: 1-2, 6-8, 12  Lumbopelvic:  1-2, BL SIJ    Physical Exam  Neurological:      General: No focal deficit present.      Mental Status: She is alert.      Sensory: Sensation is intact.      Motor: Motor function is intact.      Coordination: Coordination is intact.      Gait: Gait is intact.      Deep Tendon Reflexes: Reflexes are normal and symmetric.      Reflex Scores:       Tricep reflexes are 2+ on the right side and 2+ on the left side.       Bicep reflexes are 2+ on the right side and 2+ on the left side.       Brachioradialis reflexes are 2+ on the right side and 2+ on the left side.       Patellar reflexes are 2+ on the right side and 2+ on the left side.       Achilles reflexes are 2+ on the right side and 2+ on the left side.     Comments: Faint +ve Vazquez's reflex (L)  11/15/23       Plan   Today's treatment:  STM to patient tolerance to hypertonic paraspinal muscles  SMT to regions of segmental dysfunction identified on exam, using age-appropriate force, and manual diversified technique.   Manual dynamic stretching of the gluteal muscles, hamstrings, upper trapezius & levator scapulae BL 6m    Treatment Plan:   The patient and I discussed the risks and benefits of chiropractic care. Based on the patient's subjective complaints along with the examination findings, it is advised that a course of chiropractic treatment by initiated. The patient provided consent for care. The patient tolerated today's treatment with little or no additional discomfort and was instructed to contact the office for questions or concerns. Will see patient once per week then every 2 weeks when symptoms become mild/manageable, further spaced apart contingent upon improvement.     This chart note was generated using dictation software, and as such, there may be typographical errors present. Abbreviations: Cervical spine (CS), cervical-thoracic (CT), Dry needling (DN), Flexion adduction internal rotation (FAIR), high velocity, low amplitude (HVLA), Lumbar spine (LS), Soft tissue manipulation (STM), spinal manipulative therapy (SMT), Straight leg raise (SLR), Thoracic spine (TS).

## 2024-06-19 ENCOUNTER — APPOINTMENT (OUTPATIENT)
Dept: INTEGRATIVE MEDICINE | Facility: CLINIC | Age: 34
End: 2024-06-19
Payer: MEDICAID

## 2024-06-19 DIAGNOSIS — M54.2 NECK PAIN: ICD-10-CM

## 2024-06-19 DIAGNOSIS — M54.50 RIGHT-SIDED LOW BACK PAIN WITHOUT SCIATICA, UNSPECIFIED CHRONICITY: ICD-10-CM

## 2024-06-19 DIAGNOSIS — M99.03 SEGMENTAL AND SOMATIC DYSFUNCTION OF LUMBAR REGION: Primary | ICD-10-CM

## 2024-06-19 DIAGNOSIS — M99.02 SEGMENTAL AND SOMATIC DYSFUNCTION OF THORACIC REGION: ICD-10-CM

## 2024-06-19 DIAGNOSIS — G89.29 CHRONIC BILATERAL LOW BACK PAIN WITHOUT SCIATICA: ICD-10-CM

## 2024-06-19 DIAGNOSIS — M54.50 CHRONIC BILATERAL LOW BACK PAIN WITHOUT SCIATICA: ICD-10-CM

## 2024-06-19 DIAGNOSIS — M99.01 SEGMENTAL AND SOMATIC DYSFUNCTION OF CERVICAL REGION: ICD-10-CM

## 2024-06-19 PROCEDURE — 98941 CHIROPRACT MANJ 3-4 REGIONS: CPT | Performed by: CHIROPRACTOR

## 2024-07-01 ENCOUNTER — APPOINTMENT (OUTPATIENT)
Dept: INTEGRATIVE MEDICINE | Facility: CLINIC | Age: 34
End: 2024-07-01
Payer: MEDICAID

## 2024-07-17 ENCOUNTER — APPOINTMENT (OUTPATIENT)
Dept: INTEGRATIVE MEDICINE | Facility: CLINIC | Age: 34
End: 2024-07-17
Payer: MEDICAID

## 2024-07-17 DIAGNOSIS — G89.29 CHRONIC BILATERAL THORACIC BACK PAIN: ICD-10-CM

## 2024-07-17 DIAGNOSIS — M79.9 POSTURAL STRAIN: ICD-10-CM

## 2024-07-17 DIAGNOSIS — M54.2 NECK PAIN: ICD-10-CM

## 2024-07-17 DIAGNOSIS — M54.50 RIGHT-SIDED LOW BACK PAIN WITHOUT SCIATICA, UNSPECIFIED CHRONICITY: ICD-10-CM

## 2024-07-17 DIAGNOSIS — M53.3 SACRAL BACK PAIN: ICD-10-CM

## 2024-07-17 DIAGNOSIS — M99.04 SEGMENTAL AND SOMATIC DYSFUNCTION OF SACRAL REGION: ICD-10-CM

## 2024-07-17 DIAGNOSIS — M99.03 SEGMENTAL AND SOMATIC DYSFUNCTION OF LUMBAR REGION: Primary | ICD-10-CM

## 2024-07-17 DIAGNOSIS — M79.10 MYALGIA: ICD-10-CM

## 2024-07-17 DIAGNOSIS — M54.6 CHRONIC BILATERAL THORACIC BACK PAIN: ICD-10-CM

## 2024-07-17 DIAGNOSIS — M99.02 SEGMENTAL AND SOMATIC DYSFUNCTION OF THORACIC REGION: ICD-10-CM

## 2024-07-17 DIAGNOSIS — M99.05 SEGMENTAL AND SOMATIC DYSFUNCTION OF PELVIC REGION: ICD-10-CM

## 2024-07-17 DIAGNOSIS — G89.29 CHRONIC BILATERAL LOW BACK PAIN WITHOUT SCIATICA: ICD-10-CM

## 2024-07-17 DIAGNOSIS — M54.50 CHRONIC BILATERAL LOW BACK PAIN WITHOUT SCIATICA: ICD-10-CM

## 2024-07-17 DIAGNOSIS — M99.01 SEGMENTAL AND SOMATIC DYSFUNCTION OF CERVICAL REGION: ICD-10-CM

## 2024-07-17 PROCEDURE — 98942 CHIROPRACTIC MANJ 5 REGIONS: CPT | Performed by: CHIROPRACTOR

## 2024-07-17 ASSESSMENT — ENCOUNTER SYMPTOMS
NAUSEA: 0
FEVER: 0
CHILLS: 0
SHORTNESS OF BREATH: 0
DIFFICULTY URINATING: 0
ABDOMINAL PAIN: 0
AGITATION: 0
CONFUSION: 0
FACIAL ASYMMETRY: 0

## 2024-07-17 NOTE — PROGRESS NOTES
Subjective   Patient ID: Virginia Rosas is a 34 y.o. female who presents today for the re-examination and treatment of pain involving their R>L neck/upper back and right sided low back tightness.    This is visit 4/15 of the calendar year. Medicaid plan.    Fall risk: None. No falls in the last 6 months.     HPI - She just got , early July, engaged in March. 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 She is doing some traveling and get job in the fall.  Her insurance is likely changing and she has not been in for a chiropractic adjustment in about 1 month.  For this reason she wanted to come in fill me in on everything that has happened and get 1 last treatment.  She is hoping to come back once she gets her new insurance situated.  No acute pain issues just her general tightness and stiffness specifically in the right side of the lower back and the bilateral neck upper back and trapezius musculature.    Last Chiropractic treatment 6/19/24 (Dr. Quintanilla): Patient reports that she has been doing relatively well overall but still experiences frequent mild pain and tightness in the right upper trapezius right-sided lower back and right gluteal region.  Has been active with exercise going to the gym doing a mixture of exercise routine including cardio and some strength training and mobility work.  Benefited from chiropractic session recently with Dr. Samuel.     6/7/24 (Dr. Samuel): Virginia is her for chiropractic care of R neck and shoulder pain, ride sided lower back pain and weakness. She feels more stiffness than pain. She denies recent trauma or changes to her health since our last encounter     Last treatment 12/19/23 (with me): Again today she would like to focus on the neck and upper back regions. LB is still stiff but she is more aware of the neck today. She is off from class until [employer] 15 and has been working out more at the gym.     Patient describes the pain as achiness, tightness,  and nagging, and rates the current pain 4 out of 10 (10 being the worst).     12/13/23: lower back pain is feeling significantly better after her last treatment.  She has been able to work out more regularly because she has been off from school for the last few days.  She suspects this may be why her neck and upper back are little more sore and stiff than usual.  She would like to focus treatment on the right greater than left upper trap and neck but check full spine for any additional joint restrictions or muscular imbalances.    12/6/23: Last visit we worked on her R QL.  This did make a significant difference and she would like to continue to work on this area again today.  She also continues to have chronic tightness of her neck upper back and shoulders.    11/29/23: Virginia would like to focus treatment on her right side of the lower back today.  This is a newer symptom but she reports she has had this pain in the past.  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  She feels that her last couple adjustments they have not gotten a lot of movement in the lower back so hopefully if we do a little soft tissue work before we can get better release of the joints today.  Secondary complaints are her chronically tight neck upper back and shoulders.     Otherwise no significant changes to her past medical history.    Review of Systems   Constitutional:  Negative for chills and fever.   HENT:  Negative for congestion and sneezing.    Eyes:  Negative for visual disturbance.   Respiratory:  Negative for shortness of breath.    Cardiovascular:  Negative for chest pain.   Gastrointestinal:  Negative for abdominal pain and nausea.   Endocrine: Negative for polyuria.   Genitourinary:  Negative for difficulty urinating.   Neurological:  Negative for facial asymmetry.   Psychiatric/Behavioral:  Negative for agitation and confusion.      Objective   Observation : normal gait and normal  posture    Physical ExamExamination findings (palpation & ROM): Tenderness, hypertonicity and trigger points throughout the right quadratus lumborum muscle which does reproduce her symptoms.  Additional, but to a lesser degree, hypertonicity and tenderness through the upper trapezius, rhomboids and lower traps.      Segmental joint dysfunction was identified in the following areas using motion palpation and/or pain provocation assessment:  Cervical: C0-C5 (Supine)  Thoracic: T3-T8 (Prone)  Lumbopelvic: R SIJ (Drop), L3-L5 (Side-posture).     Today's treatment:  Performed spinal manipulation to the regions of segmental dysfunction identified on examination using age-appropriate and injury specific force, and manual diversified technique.     Brief supine myofascial release to the neck upper back and shoulders including the right upper trapezius and levator scapula.  Additional dry needling in prone position to the bilateral upper trapezii (10 in, 10 out) and additional ischemic compression to the lumbar paraspinals prior to manipulation.    Treatment Plan:   The patient and I discussed the risks and benefits of chiropractic care. Based on the patient's subjective complaints along with the examination findings, it is advised that a course of chiropractic treatment by initiated. Consent for care was given both written and orally by the patient. The patient tolerated today's treatment with little or no additional discomfort and was instructed to contact the office for questions or concerns.     Treatment Frequency: Will see/treat patient every 2-4 weeks as therapeutic benefit is sustained, then frequency will be reduced to as needed for symptom management or as needed for acute flare-ups/exacerbation.     Please note: Voice-to-text software was used when completing this note.  While the note was proofread, portions may include grammatical errors.  Please contact me with any questions/concerns as it relates to these  types of errors.